# Patient Record
Sex: FEMALE | Race: WHITE | Employment: OTHER | URBAN - METROPOLITAN AREA
[De-identification: names, ages, dates, MRNs, and addresses within clinical notes are randomized per-mention and may not be internally consistent; named-entity substitution may affect disease eponyms.]

---

## 2017-05-01 PROBLEM — J06.9 URI, ACUTE: Status: ACTIVE | Noted: 2017-05-01

## 2017-10-02 PROBLEM — R41.3 MEMORY LOSS: Status: ACTIVE | Noted: 2017-10-02

## 2017-10-10 ENCOUNTER — HOSPITAL ENCOUNTER (OUTPATIENT)
Dept: PHYSICAL THERAPY | Age: 82
Discharge: HOME | End: 2017-10-10
Attending: FAMILY MEDICINE
Payer: MEDICARE

## 2017-10-10 DIAGNOSIS — R26.89 IMBALANCE: ICD-10-CM

## 2017-10-10 PROCEDURE — G8978 MOBILITY CURRENT STATUS: HCPCS

## 2017-10-10 PROCEDURE — G8979 MOBILITY GOAL STATUS: HCPCS

## 2017-10-10 PROCEDURE — G8980 MOBILITY D/C STATUS: HCPCS

## 2017-10-10 PROCEDURE — 97161 PT EVAL LOW COMPLEX 20 MIN: CPT

## 2017-10-10 NOTE — PROGRESS NOTES
Louisa Polk  : 1935  [place] / [place] / Product Type: Medicare /  [address]  at [address]  [address], [address], [place] [address]  [phone]   [phone]          OUTPATIENT PHYSICAL THERAPY:Initial Assessment and Discharge 10/10/2017    ICD-10: Treatment Diagnosis: Difficulty in walking, not elsewhere classified (R26.2)  Precautions/Allergies:   Pcn [penicillins] and Pregabalin   Fall Risk Score: 6 (? 5 = High Risk)  MD Orders: evaluate and treat MEDICAL/REFERRING DIAGNOSIS:  Imbalance [R26.89]   DATE OF ONSET: Chronic   REFERRING PHYSICIAN: Laurie Copeland.,*  RETURN PHYSICIAN APPOINTMENT: early 2017     INITIAL ASSESSMENT:  Ms. Violet Jean presents in therapy today for evaluation of balance issues; however, patient reports no significant issues with her balance or LE strength. She and I agreed that the best current plan is to review a comprehensive HEP for LE strengthening to help with her very rare episodes of weakness or imbalance, and that if she feels she needs formal/regular PT at her next MD visit, she will receive an updated order. Therefore, we reviewed her HEP which patient verbalized understanding, I instructed her to contact me with any questions/concerns between now and her next MD visit in November, and she was very appreciative of the one-time visit with HEP. PROBLEM LIST (Impacting functional limitations):  1. Decreased Strength  2. Decreased ADL/Functional Activities  3. Decreased Ambulation Ability/Technique  4. Decreased Balance  5. Increased Pain  6. Decreased Activity Tolerance  7. Decreased Flexibility/Joint Mobility  8. Decreased Knowledge of Precautions  9. Decreased [place] with Home Exercise Program INTERVENTIONS PLANNED:  1. Balance Exercise  2. Cold  3. Family Education  4. Gait Training  5. Heat  6. Home Exercise Program (HEP)  7. Manual Therapy  8.  Neuromuscular Re-education/Strengthening  9. Range of Motion (ROM)  10. Therapeutic Activites  11. Therapeutic Exercise/Strengthening   TREATMENT PLAN:  Effective Dates: 10/10/17 TO 10/10/17. Frequency/Duration: 1 visit   GOALS: (Goals have been discussed and agreed upon with patient.)     SHORT-TERM FUNCTIONAL GOALS: Time Frame: 3 weeks  1. Patient will be verbalized understanding of HEP focused on lower extremity strengthening and ROM. GOAL MET 10/10/2017           Rehabilitation Potential For Stated Goals: Good  Regarding Alexys Tubbs's therapy, I certify that the treatment plan above will be carried out by a therapist or under their direction. Thank you for this referral,  Klaudia Chavarria, PT, DPT     Referring Physician Signature: Brian Schmidt.,*              Date                    The information in this section was collected on 10-10-17 (except where otherwise noted). HISTORY:   History of Present Injury/Illness (Reason for Referral):  Patient states no significant issues with balance or LE weakness. She does report an episode of tripping on a stair 2 weeks ago, but states that this is not a regular occurrence. Past Medical History/Comorbidities:   Ms. Jenae Johnston  has a past medical history of Anxiety; Cancer ([facility] [address] (1981); Depression; GERD (gastroesophageal reflux disease); Hypercholesteremia; Hypertension; Hypothyroidism; Menopause; Osteopenia; Retinal detachment; and Unspecified vitamin D deficiency. Ms. Jenae Johnston  has a past surgical history that includes mastectomy (1981); tonsillectomy (as a child); colonoscopy (2006); chidi and bso (1981); cataract removal (Left, 2013); and other surgical (Left, 2013). Social History/Living Environment:     Independent and lives with spouse, states she has many stairs throughout the house. Prior Level of Function/Work/Activity:  Independent   Personal Factors:          Sex:  female        Age:  [de-identified] y.o.         Overall Behavior:  Very pleasant but not interested in regular PT at this time    Current Medications:       Current Outpatient Prescriptions:     escitalopram oxalate (LEXAPRO) 20 mg tablet, Take 0.5 Tabs by mouth daily. , Disp: 30 Tab, Rfl: 5    levothyroxine (SYNTHROID) 112 mcg tablet, Take 1 Tab by mouth Daily (before breakfast). , Disp: 90 Tab, Rfl: 1    raloxifene (EVISTA) 60 mg tablet, Take 1 Tab by mouth every morning., Disp: 90 Tab, Rfl: 1    benazepril (LOTENSIN) 20 mg tablet, Take 1 Tab by mouth every morning., Disp: 90 Tab, Rfl: 1    simvastatin (ZOCOR) 40 mg tablet, Take 1 Tab by mouth nightly., Disp: 90 Tab, Rfl: 1    fluticasone (FLONASE) 50 mcg/actuation nasal spray, 2 Sprays by Both Nostrils route daily. , Disp: 1 Bottle, Rfl: 11    olopatadine (PATADAY) 0.2 % drop ophthalmic solution, Administer 1 Drop to both eyes daily. , Disp: , Rfl:     loratadine (CLARITIN) 10 mg tablet, Take 10 mg by mouth daily. , Disp: , Rfl:     cholecalciferol (VITAMIN D3) 1,000 unit tablet, Take 1,000 Units by mouth daily. , Disp: , Rfl:     multivitamin (ONE A DAY) tablet, Take 1 Tab by mouth daily. , Disp: , Rfl:     calcium carbonate (TUMS) 200 mg calcium (500 mg) Chew, Take 2 Tabs by mouth as needed. , Disp: , Rfl:    Date Last Reviewed:  10/10/2017     Number of Personal Factors/Comorbidities that affect the Plan of Care: 1-2: MODERATE COMPLEXITY   EXAMINATION:   Observation/Orthostatic Postural Assessment:          Patient ambulates with no assistive device, no path deviations noted during ambulation or antalgic gait; stands up from seated position without use of UE  Palpation:        N/A  ROM:          WFL throughout UEs and LEs  Strength:          WFL throughout B/L LEs       Body Structures Involved:  1. Bones  2. Joints  3. Muscles  4. Ligaments Body Functions Affected:  1. Sensory/Pain  2. Movement Related Activities and Participation Affected:  1. General Tasks and Demands  2. Mobility  3. Self Care  4. Domestic Life  5.  Interpersonal Interactions and Relationships  6. Community, Social and [place]   Number of elements (examined above) that affect the Plan of Care: 1-2: LOW COMPLEXITY   CLINICAL PRESENTATION:   Presentation: Stable and uncomplicated: LOW COMPLEXITY   CLINICAL DECISION MAKING:   Outcome Measure: Tool Used: Lower Extremity Functional Scale (LEFS)  Score:  Initial: 56/80 Most Recent: X/80 (Date: -- )   Interpretation of Score: 20 questions each scored on a 5 point scale with 0 representing \"extreme difficulty or unable to perform\" and 4 representing \"no difficulty\". The lower the score, the greater the functional disability. 80/80 represents no disability. Minimal detectable change is 9 points. Score 80 79-63 62-48 47-32 31-16 15-1 0   Modifier CH CI CJ CK CL CM CN     ? Mobility - Walking and Moving Around:     - CURRENT STATUS: CJ - 20%-39% impaired, limited or restricted    - GOAL STATUS: CJ - 20%-39% impaired, limited or restricted    - D/C STATUS:  CJ - 20%-39% impaired, limited or restricted       Use of outcome tool(s) and clinical judgement create a POC that gives a: Clear prediction of patient's progress: LOW COMPLEXITY            TREATMENT:   (In addition to Assessment/Re-Assessment sessions the following treatments were rendered)  Pre-treatment Symptoms/Complaints:  Patient asks \"why am I here? \" Very pleasant, but not interested in regular or formal PT. She states she does not have issues with balance, ambulation or strength. She is very agreeable with plan to receive HEP and to follow-up with any questions/concerns via email or phone. Pain: Initial:   0/10 Post Session:  0/10       Treatment/Session Assessment:    · Response to Treatment:  Patient verbalized understanding of plan of care and HEP.      Total Treatment Duration:  PT Patient Time In/Time Out  Time In: 0900  Time Out: 1000    Chrissy Lozoya, PT, DPT

## 2017-11-14 PROBLEM — J06.9 URI, ACUTE: Status: RESOLVED | Noted: 2017-05-01 | Resolved: 2017-11-14

## 2017-11-28 ENCOUNTER — HOSPITAL ENCOUNTER (OUTPATIENT)
Dept: PHYSICAL THERAPY | Age: 82
Discharge: HOME | End: 2017-11-28
Attending: FAMILY MEDICINE

## 2017-11-28 NOTE — PROGRESS NOTES
Patient came in today for her second evaluation for balance. Her first evaluation was a one-time visit only, patient citing no need for follow-up or PT interventions. Today, she arrived, and again, seemed confused on why she was here. She states no balance deficits and attends a local gym. I advised that she at least attend 4 sessions to focus on her LE strength and balance, and she was in agreement with this plan. However, she did not believe she could attend formal PT until January 2018, so she will need to have an updated order and a formal evaluation at this time. I spoke and left a message with MD office, repeating the above plan and it was to be forwarded to Dr. Lena Davis and/or the nurse. Thank you for the referral and please contact me with any questions.      Salud Sandra, PT, DPT    [phone]

## 2018-01-02 PROBLEM — R63.4 WEIGHT LOSS: Status: ACTIVE | Noted: 2018-01-02

## 2018-01-02 PROBLEM — M54.50 ACUTE MIDLINE LOW BACK PAIN WITHOUT SCIATICA: Status: ACTIVE | Noted: 2018-01-02

## 2018-01-12 ENCOUNTER — TELEPHONE (OUTPATIENT)
Dept: NUTRITION | Age: 83
End: 2018-01-12

## 2018-01-16 ENCOUNTER — TELEPHONE (OUTPATIENT)
Dept: NUTRITION | Age: 83
End: 2018-01-16

## 2018-01-16 NOTE — TELEPHONE ENCOUNTER
Nutrition Counseling:  Spoke with pt regarding non-coverage by insurance. Gave self-pay rates. Pt declined at this time and states \"things are very, very difficult right now, and I cannot come in anyway. \" Advised to f/u with referring physician and to reconsider assistance as needed if the opportunity is better in the future.     Carrie Burks MS, [address] [place] [address], [address], [place]  W: [phone]  C: [phone]

## 2018-06-21 PROBLEM — M21.612 BUNION, LEFT FOOT: Status: ACTIVE | Noted: 2018-06-21

## 2018-08-16 PROBLEM — G31.84 MILD COGNITIVE IMPAIRMENT: Status: ACTIVE | Noted: 2018-08-16
